# Patient Record
Sex: FEMALE | Race: WHITE | NOT HISPANIC OR LATINO | URBAN - METROPOLITAN AREA
[De-identification: names, ages, dates, MRNs, and addresses within clinical notes are randomized per-mention and may not be internally consistent; named-entity substitution may affect disease eponyms.]

---

## 2017-05-09 ENCOUNTER — ALLSCRIPTS OFFICE VISIT (OUTPATIENT)
Dept: OTHER | Facility: OTHER | Age: 37
End: 2017-05-09

## 2017-09-26 ENCOUNTER — ALLSCRIPTS OFFICE VISIT (OUTPATIENT)
Dept: OTHER | Facility: OTHER | Age: 37
End: 2017-09-26

## 2018-01-13 VITALS — WEIGHT: 139.13 LBS | HEIGHT: 64 IN | BODY MASS INDEX: 23.75 KG/M2

## 2018-01-31 ENCOUNTER — OFFICE VISIT (OUTPATIENT)
Dept: PLASTIC SURGERY | Facility: HOSPITAL | Age: 38
End: 2018-01-31

## 2018-01-31 VITALS — BODY MASS INDEX: 21.82 KG/M2 | WEIGHT: 127.8 LBS | HEIGHT: 64 IN

## 2018-01-31 DIAGNOSIS — L98.8 FACIAL RHYTIDS: Primary | ICD-10-CM

## 2018-01-31 PROCEDURE — 64612 DESTROY NERVE FACE MUSCLE: CPT | Performed by: SURGERY

## 2018-01-31 RX ORDER — DEXTROAMPHETAMINE SACCHARATE, AMPHETAMINE ASPARTATE MONOHYDRATE, DEXTROAMPHETAMINE SULFATE AND AMPHETAMINE SULFATE 2.5; 2.5; 2.5; 2.5 MG/1; MG/1; MG/1; MG/1
10 CAPSULE, EXTENDED RELEASE ORAL 2 TIMES DAILY
COMMUNITY
End: 2019-10-08 | Stop reason: ALTCHOICE

## 2018-01-31 NOTE — PROGRESS NOTES
Assessment/Plan:40 units of Botox was administered in the usual fashion to treat the glabella, brow, forehead, and lateral crow's feet  Of this, approximately 2 5 units was used at the level of the procerus  the usual posttreatment instructions were given  It was nearly 4 months since her previous visit, she has noticed that the effect had dissipated several weeks ago,  I suggested that she shorten the interval between treatments to closer to 3 months maximize the results  Subjective: dynamic creases of glabella, brow, forehead, lateral crow's feet  Bunny lines     Patient ID: Hailey Verma is a 40 y o  female      [facility] returns for Botox, she is quite pleased with her prior treatments, she noticed the effect dissipated several weeks    Review of Systems      Objective:     Physical Exam

## 2018-05-04 ENCOUNTER — OFFICE VISIT (OUTPATIENT)
Dept: PLASTIC SURGERY | Facility: CLINIC | Age: 38
End: 2018-05-04

## 2018-05-04 VITALS — BODY MASS INDEX: 20.33 KG/M2 | WEIGHT: 122 LBS | HEIGHT: 65 IN

## 2018-05-04 DIAGNOSIS — L98.8 FACIAL RHYTIDS: Primary | ICD-10-CM

## 2018-05-04 PROCEDURE — BOTOX1U PR BOTOX BY THE UNIT: Performed by: SURGERY

## 2018-05-04 NOTE — PROGRESS NOTES
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

## 2018-08-01 ENCOUNTER — OFFICE VISIT (OUTPATIENT)
Dept: PLASTIC SURGERY | Facility: HOSPITAL | Age: 38
End: 2018-08-01

## 2018-08-01 DIAGNOSIS — Z41.1 ENCOUNTER FOR COSMETIC PROCEDURE: ICD-10-CM

## 2018-08-01 PROCEDURE — BOTOX1U PR BOTOX BY THE UNIT: Performed by: SURGERY

## 2018-08-01 NOTE — PROGRESS NOTES
Kari Dipti returns for Botox, she has been quite pleased with her prior treatments  At today's visit 40 U of Botox was administered in the usual fashion to treat the glabella, brow, forehead, and lateral crows feet, of this, approximately 2 5 U was used at the level of the procerus  The usual post treatment instructions were given, she will be seen again in 3 months  Her store [employer] is located at [address] [place] [address] in [facility]

## 2018-11-28 ENCOUNTER — COSMETIC (OUTPATIENT)
Dept: PLASTIC SURGERY | Facility: HOSPITAL | Age: 38
End: 2018-11-28

## 2018-11-28 DIAGNOSIS — Z41.1 ENCOUNTER FOR COSMETIC PROCEDURE: ICD-10-CM

## 2018-11-28 PROCEDURE — BOTOX1U PR BOTOX BY THE UNIT: Performed by: SURGERY

## 2018-11-28 NOTE — PROGRESS NOTES
Oscar Mirza returns for Botox, she has been quite pleased with her prior treatments  At today's visit 40 units of Botox was administered in the usual fashion to treat the glabella, brow, forehead, and lateral crows feet, of this, approximately 2 5 units was used at the level of the procerus she expressed some interest in addressing the nasolabial folds, and we discussed the role of Juvederm, how it works, duration, etc   She will consider this for her next visit, also potentially addressing the marionette lines in submental crease  The usual post Botox instructions were given and she will be seen in 3 months

## 2018-12-28 ENCOUNTER — COSMETIC (OUTPATIENT)
Dept: PLASTIC SURGERY | Facility: CLINIC | Age: 38
End: 2018-12-28

## 2018-12-28 VITALS — WEIGHT: 122 LBS | BODY MASS INDEX: 20.33 KG/M2 | HEIGHT: 65 IN

## 2018-12-28 DIAGNOSIS — Z41.1 ENCOUNTER FOR COSMETIC SURGERY: Primary | ICD-10-CM

## 2018-12-28 PROCEDURE — COSCON: Performed by: SURGERY

## 2018-12-28 NOTE — PROGRESS NOTES
Assessment/Plan:  Please see HPI  We discussed breast augmentation in detail, including options for implants, saline versus silicone, smooth versus textured surface, round versus anatomic  We talked about the phenomenon of breast implant associated lymphoma  We discussed different incision choices including trans axillary periareolar, inframammary crease, trans umbilical   My strong preference is for the inframammary crease approach  We discussed the procedure, how it is performed, as well as potential risks, complications and limitations including, but not limited to infection, bleeding, scarring, asymmetry, contour deformity, inability to guarantee a cup size, she is aware that implants are not lifelong devices and that they will need to be replaced  Silicone implants care are to be assessed with MRI surveillance on an interval basis  The appropriate measurements and photographs were obtained  She tried several implants as well as implant sizers in a bra and is happiest in the range of 450 cc, specifically 450 cc on the right, 425 cc on the left, as the left breast is a bit larger than the right breast   We reviewed photographs of reasonable results, and their questions were answered to their satisfaction  I have directed them to implantinfo  com to review some photographs of desirable results, she will download those and we will review them at her next visit  She has been given the breast augmentation brochure  There are no diagnoses linked to this encounter  Subjective: Interested in breast augmentation      Patient ID: Taiwo Bergman is a 45 y o  female  HPI she is here today to discuss breast augmentation, she is accompanied by her   She currently wears a 34 B cup bra and is interested in augmentation mammoplasty    She has an upcoming trip in mid February and is contemplating surgery prior to the trip, with approximately a 1 month interval for convalescence/recovery prior to the trip     The following portions of the patient's history were reviewed and updated as appropriate: allergies, current medications, past family history, past medical history, past social history, past surgical history and problem list     Review of Systems      Objective:      Ht 5' 5" (1 651 m)   Wt 55 3 kg (122 lb)   BMI 20 30 kg/m²          Physical Exam   Constitutional: She is oriented to person, place, and time  She appears well-developed and well-nourished  HENT:   Head: Normocephalic  Eyes: Pupils are equal, round, and reactive to light  Neck: Normal range of motion  Pulmonary/Chest: Effort normal    Abdominal: Soft  Musculoskeletal: Normal range of motion  Neurological: She is alert and oriented to person, place, and time  Skin: Skin is warm  B cup breasts, presence of bilateral axillary breasts, sternal notch to nipple distances are 19 cm on the left, 20 cm on the right, intermammary distance is 18 cm  The inframammary crease to nipple distances are 5 5/8 cm on the left, 6/7 5 cm on the right  The left breast is somewhat larger than the right breast    Psychiatric: She has a normal mood and affect

## 2019-01-02 ENCOUNTER — COSMETIC (OUTPATIENT)
Dept: PLASTIC SURGERY | Facility: HOSPITAL | Age: 39
End: 2019-01-02

## 2019-01-02 DIAGNOSIS — Z41.1 ENCOUNTER FOR COSMETIC SURGERY: Primary | ICD-10-CM

## 2019-01-02 PROCEDURE — RECHECK: Performed by: SURGERY

## 2019-01-07 PROBLEM — Z41.1 ENCOUNTER FOR COSMETIC PROCEDURE: Status: ACTIVE | Noted: 2019-01-07

## 2019-01-09 ENCOUNTER — COSMETIC (OUTPATIENT)
Dept: PLASTIC SURGERY | Facility: HOSPITAL | Age: 39
End: 2019-01-09

## 2019-01-09 DIAGNOSIS — Z41.1 ENCOUNTER FOR COSMETIC SURGERY: Primary | ICD-10-CM

## 2019-01-09 PROCEDURE — RECHECK: Performed by: SURGERY

## 2019-01-09 NOTE — PROGRESS NOTES
[facility] returns for a preoperative visit to review breast augmentation photographs that she has downloaded from the Internet  Again she is happy with an implant range of 425-475 cc or so, the photographs to picked patient's with 425 cc implants, she will leave it up to me to determine the final implant size based on the appearance in the operating room  We reviewed the photos together, her questions were answered to her satisfaction, and reviewed reviewed the procedure again as far as potential risks, complications and limitations including, but not limited to infection, bleeding, scarring, asymmetry, she is aware that implants are not lifelong devices, there may be rippling, migration, deflation, silicone implants benefit from MRI surveillance  Consent was obtained  She will be seen the day prior to surgery to be marked

## 2019-01-14 ENCOUNTER — APPOINTMENT (OUTPATIENT)
Dept: LAB | Facility: MEDICAL CENTER | Age: 39
End: 2019-01-14
Payer: COMMERCIAL

## 2019-01-14 DIAGNOSIS — Z41.1 ENCOUNTER FOR COSMETIC PROCEDURE: ICD-10-CM

## 2019-01-14 LAB
ANION GAP SERPL CALCULATED.3IONS-SCNC: 6 MMOL/L (ref 4–13)
BASOPHILS # BLD AUTO: 0.07 THOUSANDS/ΜL (ref 0–0.1)
BASOPHILS NFR BLD AUTO: 1 % (ref 0–1)
BUN SERPL-MCNC: 14 MG/DL (ref 5–25)
CALCIUM SERPL-MCNC: 9.2 MG/DL (ref 8.3–10.1)
CHLORIDE SERPL-SCNC: 103 MMOL/L (ref 100–108)
CO2 SERPL-SCNC: 28 MMOL/L (ref 21–32)
CREAT SERPL-MCNC: 0.94 MG/DL (ref 0.6–1.3)
EOSINOPHIL # BLD AUTO: 0.39 THOUSAND/ΜL (ref 0–0.61)
EOSINOPHIL NFR BLD AUTO: 6 % (ref 0–6)
ERYTHROCYTE [DISTWIDTH] IN BLOOD BY AUTOMATED COUNT: 12.3 % (ref 11.6–15.1)
GFR SERPL CREATININE-BSD FRML MDRD: 77 ML/MIN/1.73SQ M
GLUCOSE SERPL-MCNC: 83 MG/DL (ref 65–140)
HCT VFR BLD AUTO: 39.3 % (ref 34.8–46.1)
HGB BLD-MCNC: 12.7 G/DL (ref 11.5–15.4)
IMM GRANULOCYTES # BLD AUTO: 0.02 THOUSAND/UL (ref 0–0.2)
IMM GRANULOCYTES NFR BLD AUTO: 0 % (ref 0–2)
LYMPHOCYTES # BLD AUTO: 2.37 THOUSANDS/ΜL (ref 0.6–4.47)
LYMPHOCYTES NFR BLD AUTO: 36 % (ref 14–44)
MCH RBC QN AUTO: 30 PG (ref 26.8–34.3)
MCHC RBC AUTO-ENTMCNC: 32.3 G/DL (ref 31.4–37.4)
MCV RBC AUTO: 93 FL (ref 82–98)
MONOCYTES # BLD AUTO: 0.58 THOUSAND/ΜL (ref 0.17–1.22)
MONOCYTES NFR BLD AUTO: 9 % (ref 4–12)
NEUTROPHILS # BLD AUTO: 3.13 THOUSANDS/ΜL (ref 1.85–7.62)
NEUTS SEG NFR BLD AUTO: 48 % (ref 43–75)
NRBC BLD AUTO-RTO: 0 /100 WBCS
PLATELET # BLD AUTO: 313 THOUSANDS/UL (ref 149–390)
PMV BLD AUTO: 9.6 FL (ref 8.9–12.7)
POTASSIUM SERPL-SCNC: 4 MMOL/L (ref 3.5–5.3)
RBC # BLD AUTO: 4.23 MILLION/UL (ref 3.81–5.12)
SODIUM SERPL-SCNC: 137 MMOL/L (ref 136–145)
WBC # BLD AUTO: 6.56 THOUSAND/UL (ref 4.31–10.16)

## 2019-01-14 PROCEDURE — 36415 COLL VENOUS BLD VENIPUNCTURE: CPT

## 2019-01-14 PROCEDURE — 85025 COMPLETE CBC W/AUTO DIFF WBC: CPT

## 2019-01-14 PROCEDURE — 80048 BASIC METABOLIC PNL TOTAL CA: CPT

## 2019-01-15 NOTE — PRE-PROCEDURE INSTRUCTIONS
Pre-Surgery Instructions:   Medication Instructions    amphetamine-dextroamphetamine (ADDERALL XR) 10 MG 24 hr capsule Instructed patient per Anesthesia Guidelines  Pre-procedure instructions given without any further questions or concerns at this time  Pt reports she will obtain the CHG wash at here appointment with dr Orion Garay tomorrow  She will review the written instructions prior to use

## 2019-01-16 ENCOUNTER — OFFICE VISIT (OUTPATIENT)
Dept: PLASTIC SURGERY | Facility: HOSPITAL | Age: 39
End: 2019-01-16

## 2019-01-16 DIAGNOSIS — Z41.1 ENCOUNTER FOR COSMETIC SURGERY: Primary | ICD-10-CM

## 2019-01-16 PROCEDURE — RECHECK: Performed by: SURGERY

## 2019-01-17 ENCOUNTER — ANESTHESIA (OUTPATIENT)
Dept: PERIOP | Facility: HOSPITAL | Age: 39
End: 2019-01-17
Payer: SELF-PAY

## 2019-01-17 ENCOUNTER — HOSPITAL ENCOUNTER (OUTPATIENT)
Facility: HOSPITAL | Age: 39
Setting detail: OUTPATIENT SURGERY
Discharge: HOME | End: 2019-01-17
Attending: SURGERY | Admitting: SURGERY
Payer: SELF-PAY

## 2019-01-17 ENCOUNTER — ANESTHESIA EVENT (OUTPATIENT)
Dept: PERIOP | Facility: HOSPITAL | Age: 39
End: 2019-01-17
Payer: SELF-PAY

## 2019-01-17 VITALS
WEIGHT: 118 LBS | BODY MASS INDEX: 19.66 KG/M2 | HEIGHT: 65 IN | SYSTOLIC BLOOD PRESSURE: 125 MMHG | OXYGEN SATURATION: 100 % | HEART RATE: 64 BPM | DIASTOLIC BLOOD PRESSURE: 78 MMHG | RESPIRATION RATE: 17 BRPM | TEMPERATURE: 99.5 F

## 2019-01-17 DIAGNOSIS — Z41.1 ENCOUNTER FOR COSMETIC PROCEDURE: Primary | ICD-10-CM

## 2019-01-17 LAB — EXT PREGNANCY TEST URINE: NEGATIVE

## 2019-01-17 PROCEDURE — 19325 BREAST AUGMENTATION W/IMPLT: CPT | Performed by: SURGERY

## 2019-01-17 PROCEDURE — C1789 PROSTHESIS, BREAST, IMP: HCPCS | Performed by: SURGERY

## 2019-01-17 PROCEDURE — 81025 URINE PREGNANCY TEST: CPT | Performed by: SURGERY

## 2019-01-17 DEVICE — SMOOTH ROUND HIGH PROFILE GEL-FILLED BREAST IMPLANT, 400CC  SMOOTH ROUND SILICONE
Type: IMPLANTABLE DEVICE | Site: BREAST | Status: FUNCTIONAL
Brand: MENTOR MEMORYGEL BREAST IMPLANT

## 2019-01-17 RX ORDER — PROPOFOL 10 MG/ML
INJECTION, EMULSION INTRAVENOUS AS NEEDED
Status: DISCONTINUED | OUTPATIENT
Start: 2019-01-17 | End: 2019-01-17 | Stop reason: SURG

## 2019-01-17 RX ORDER — FENTANYL CITRATE/PF 50 MCG/ML
25 SYRINGE (ML) INJECTION
Status: DISCONTINUED | OUTPATIENT
Start: 2019-01-17 | End: 2019-01-17 | Stop reason: HOSPADM

## 2019-01-17 RX ORDER — HYDROCODONE BITARTRATE AND ACETAMINOPHEN 5; 325 MG/1; MG/1
2 TABLET ORAL EVERY 6 HOURS PRN
Status: DISCONTINUED | OUTPATIENT
Start: 2019-01-17 | End: 2019-01-17 | Stop reason: HOSPADM

## 2019-01-17 RX ORDER — ONDANSETRON 4 MG/1
4 TABLET, ORALLY DISINTEGRATING ORAL EVERY 8 HOURS PRN
Qty: 10 TABLET | Refills: 0 | Status: SHIPPED | OUTPATIENT
Start: 2019-01-17 | End: 2019-10-08 | Stop reason: ALTCHOICE

## 2019-01-17 RX ORDER — FENTANYL CITRATE 50 UG/ML
INJECTION, SOLUTION INTRAMUSCULAR; INTRAVENOUS AS NEEDED
Status: DISCONTINUED | OUTPATIENT
Start: 2019-01-17 | End: 2019-01-17 | Stop reason: SURG

## 2019-01-17 RX ORDER — SODIUM CHLORIDE, SODIUM LACTATE, POTASSIUM CHLORIDE, CALCIUM CHLORIDE 600; 310; 30; 20 MG/100ML; MG/100ML; MG/100ML; MG/100ML
125 INJECTION, SOLUTION INTRAVENOUS CONTINUOUS
Status: DISCONTINUED | OUTPATIENT
Start: 2019-01-17 | End: 2019-01-17 | Stop reason: HOSPADM

## 2019-01-17 RX ORDER — METOCLOPRAMIDE HYDROCHLORIDE 5 MG/ML
10 INJECTION INTRAMUSCULAR; INTRAVENOUS ONCE AS NEEDED
Status: COMPLETED | OUTPATIENT
Start: 2019-01-17 | End: 2019-01-17

## 2019-01-17 RX ORDER — GLYCOPYRROLATE 0.2 MG/ML
INJECTION INTRAMUSCULAR; INTRAVENOUS AS NEEDED
Status: DISCONTINUED | OUTPATIENT
Start: 2019-01-17 | End: 2019-01-17 | Stop reason: SURG

## 2019-01-17 RX ORDER — NEOSTIGMINE METHYLSULFATE 1 MG/ML
INJECTION INTRAVENOUS AS NEEDED
Status: DISCONTINUED | OUTPATIENT
Start: 2019-01-17 | End: 2019-01-17 | Stop reason: SURG

## 2019-01-17 RX ORDER — ROCURONIUM BROMIDE 10 MG/ML
INJECTION, SOLUTION INTRAVENOUS AS NEEDED
Status: DISCONTINUED | OUTPATIENT
Start: 2019-01-17 | End: 2019-01-17 | Stop reason: SURG

## 2019-01-17 RX ORDER — METHOCARBAMOL 500 MG/1
500 TABLET, FILM COATED ORAL EVERY 6 HOURS PRN
Status: DISCONTINUED | OUTPATIENT
Start: 2019-01-17 | End: 2019-01-17 | Stop reason: HOSPADM

## 2019-01-17 RX ORDER — ONDANSETRON 2 MG/ML
INJECTION INTRAMUSCULAR; INTRAVENOUS AS NEEDED
Status: DISCONTINUED | OUTPATIENT
Start: 2019-01-17 | End: 2019-01-17 | Stop reason: SURG

## 2019-01-17 RX ORDER — DEXAMETHASONE SODIUM PHOSPHATE 4 MG/ML
INJECTION, SOLUTION INTRA-ARTICULAR; INTRALESIONAL; INTRAMUSCULAR; INTRAVENOUS; SOFT TISSUE AS NEEDED
Status: DISCONTINUED | OUTPATIENT
Start: 2019-01-17 | End: 2019-01-17 | Stop reason: SURG

## 2019-01-17 RX ORDER — BUPIVACAINE HYDROCHLORIDE 2.5 MG/ML
INJECTION, SOLUTION EPIDURAL; INFILTRATION; INTRACAUDAL AS NEEDED
Status: DISCONTINUED | OUTPATIENT
Start: 2019-01-17 | End: 2019-01-17 | Stop reason: HOSPADM

## 2019-01-17 RX ORDER — CEFAZOLIN SODIUM 1 G/50ML
1000 SOLUTION INTRAVENOUS ONCE
Status: COMPLETED | OUTPATIENT
Start: 2019-01-17 | End: 2019-01-17

## 2019-01-17 RX ORDER — HYDROCODONE BITARTRATE AND ACETAMINOPHEN 5; 325 MG/1; MG/1
1 TABLET ORAL EVERY 6 HOURS PRN
Qty: 30 TABLET | Refills: 0 | Status: SHIPPED | OUTPATIENT
Start: 2019-01-17 | End: 2019-10-08 | Stop reason: ALTCHOICE

## 2019-01-17 RX ORDER — MIDAZOLAM HYDROCHLORIDE 1 MG/ML
INJECTION INTRAMUSCULAR; INTRAVENOUS AS NEEDED
Status: DISCONTINUED | OUTPATIENT
Start: 2019-01-17 | End: 2019-01-17 | Stop reason: SURG

## 2019-01-17 RX ADMIN — HYDROCODONE BITARTRATE AND ACETAMINOPHEN 1 TABLET: 5; 325 TABLET ORAL at 21:11

## 2019-01-17 RX ADMIN — FENTANYL CITRATE 50 MCG: 50 INJECTION, SOLUTION INTRAMUSCULAR; INTRAVENOUS at 18:19

## 2019-01-17 RX ADMIN — FENTANYL CITRATE 50 MCG: 50 INJECTION, SOLUTION INTRAMUSCULAR; INTRAVENOUS at 18:55

## 2019-01-17 RX ADMIN — ONDANSETRON 4 MG: 2 INJECTION, SOLUTION INTRAMUSCULAR; INTRAVENOUS at 19:12

## 2019-01-17 RX ADMIN — NEOSTIGMINE METHYLSULFATE 3 MG: 1 INJECTION INTRAVENOUS at 19:23

## 2019-01-17 RX ADMIN — PROPOFOL 200 MG: 10 INJECTION, EMULSION INTRAVENOUS at 17:28

## 2019-01-17 RX ADMIN — FENTANYL CITRATE 50 MCG: 50 INJECTION, SOLUTION INTRAMUSCULAR; INTRAVENOUS at 19:31

## 2019-01-17 RX ADMIN — METHOCARBAMOL 500 MG: 500 TABLET, FILM COATED ORAL at 21:12

## 2019-01-17 RX ADMIN — FENTANYL CITRATE 50 MCG: 50 INJECTION, SOLUTION INTRAMUSCULAR; INTRAVENOUS at 17:26

## 2019-01-17 RX ADMIN — GLYCOPYRROLATE 0.6 MG: 0.2 INJECTION, SOLUTION INTRAMUSCULAR; INTRAVENOUS at 19:23

## 2019-01-17 RX ADMIN — ROCURONIUM BROMIDE 10 MG: 10 INJECTION, SOLUTION INTRAVENOUS at 18:10

## 2019-01-17 RX ADMIN — SODIUM CHLORIDE, SODIUM LACTATE, POTASSIUM CHLORIDE, AND CALCIUM CHLORIDE: .6; .31; .03; .02 INJECTION, SOLUTION INTRAVENOUS at 17:55

## 2019-01-17 RX ADMIN — SODIUM CHLORIDE, SODIUM LACTATE, POTASSIUM CHLORIDE, AND CALCIUM CHLORIDE: .6; .31; .03; .02 INJECTION, SOLUTION INTRAVENOUS at 14:49

## 2019-01-17 RX ADMIN — ROCURONIUM BROMIDE 10 MG: 10 INJECTION, SOLUTION INTRAVENOUS at 18:39

## 2019-01-17 RX ADMIN — FENTANYL CITRATE 25 MCG: 50 INJECTION, SOLUTION INTRAMUSCULAR; INTRAVENOUS at 20:26

## 2019-01-17 RX ADMIN — ROCURONIUM BROMIDE 30 MG: 10 INJECTION, SOLUTION INTRAVENOUS at 17:28

## 2019-01-17 RX ADMIN — CEFAZOLIN SODIUM 1000 MG: 1 SOLUTION INTRAVENOUS at 17:40

## 2019-01-17 RX ADMIN — METOCLOPRAMIDE 10 MG: 5 INJECTION, SOLUTION INTRAMUSCULAR; INTRAVENOUS at 20:10

## 2019-01-17 RX ADMIN — SODIUM CHLORIDE, SODIUM LACTATE, POTASSIUM CHLORIDE, AND CALCIUM CHLORIDE 125 ML/HR: .6; .31; .03; .02 INJECTION, SOLUTION INTRAVENOUS at 13:33

## 2019-01-17 RX ADMIN — MIDAZOLAM HYDROCHLORIDE 2 MG: 1 INJECTION, SOLUTION INTRAMUSCULAR; INTRAVENOUS at 17:23

## 2019-01-17 RX ADMIN — DEXAMETHASONE SODIUM PHOSPHATE 4 MG: 4 INJECTION, SOLUTION INTRAMUSCULAR; INTRAVENOUS at 18:20

## 2019-01-17 NOTE — H&P (VIEW-ONLY)
Chris Marino returns for a preoperative visit to review breast augmentation photographs that she has downloaded from the Internet  Again she is happy with an implant range of 425-475 cc or so, the photographs to picked patient's with 425 cc implants, she will leave it up to me to determine the final implant size based on the appearance in the operating room  We reviewed the photos together, her questions were answered to her satisfaction, and reviewed reviewed the procedure again as far as potential risks, complications and limitations including, but not limited to infection, bleeding, scarring, asymmetry, she is aware that implants are not lifelong devices, there may be rippling, migration, deflation, silicone implants benefit from MRI surveillance  Consent was obtained  She will be seen the day prior to surgery to be marked

## 2019-01-17 NOTE — ANESTHESIA PREPROCEDURE EVALUATION
Review of Systems/Medical History  Patient summary reviewed  Chart reviewed      Cardiovascular   Pulmonary       GI/Hepatic            Endo/Other     GYN       Hematology   Musculoskeletal       Neurology      Comment: ADD Psychology           Physical Exam    Airway    Mallampati score: I  TM Distance: >3 FB  Neck ROM: full     Dental       Cardiovascular  Cardiovascular exam normal    Pulmonary  Pulmonary exam normal     Other Findings        Anesthesia Plan  ASA Score- 2     Anesthesia Type- general with ASA Monitors  Additional Monitors:   Airway Plan: ETT  Plan Factors-    Induction- intravenous  Postoperative Plan-     Informed Consent- Anesthetic plan and risks discussed with patient  I personally reviewed this patient with the CRNA  Discussed and agreed on the Anesthesia Plan with the CRNA  Alvaro Prado

## 2019-01-18 ENCOUNTER — OFFICE VISIT (OUTPATIENT)
Dept: PLASTIC SURGERY | Facility: CLINIC | Age: 39
End: 2019-01-18

## 2019-01-18 DIAGNOSIS — Z48.89 POSTOPERATIVE VISIT: Primary | ICD-10-CM

## 2019-01-18 PROCEDURE — 99024 POSTOP FOLLOW-UP VISIT: CPT | Performed by: SURGERY

## 2019-01-18 NOTE — PROGRESS NOTES
Agnieszka Lindsay presents for a postoperative visit, 1st postop day status post bilateral augmentation mammoplasty  At today's visit the dressings were removed, the Steri-Strips are intact, the breasts look quite good, they are nicely symmetric, and there is no evidence of hematoma, fluid collection, etc   An implant strap was applied to apply pressure to the upper pole of the breasts, and the usual instructions were given  She will be seen again next week, we will discuss implant displacement exercises with her at that time  She knows to call should she have any questions

## 2019-01-18 NOTE — OP NOTE
OPERATIVE REPORT  PATIENT NAME: Seble Boland    :  1980  MRN: [id number]  Pt Location: [facility] OR ROOM 01    SURGERY DATE: 2019    Surgeon(s) and Role:     * Lyla Latham MD - Primary     * Mariely Scott MD - Assisting    Preop Diagnosis:  Encounter for cosmetic procedure [Z41 1]    Post-Op Diagnosis Codes:     * Encounter for cosmetic procedure [Z41 1]    Procedure(s) (LRB):  AUGMENTATION BREAST (Bilateral)    Specimen(s):  * No specimens in log *    Estimated Blood Loss:   Minimal    Drains:       Anesthesia Type:   General    Operative Indications:  Encounter for cosmetic procedure [Z41 1]  Desire for larger breast volume    Operative Findings:      Complications:   None    Procedure and Technique:  Anthony Morse was seen in the office the day prior to surgery, she was marked in usual fashion for bilateral breast augmentation, and we again discussed the procedure, potential risks, complications and limitations  The day of surgery, those marks were reinforced, she was then taken to the operating room and underwent induction of general anesthesia by the anesthesia personnel  The operative field was prepped and draped in sterile fashion a proper time-out was performed  The right side was addressed initially, the breast was infiltrated with xylocaine with epinephrine the usual fashion for augmentation mammoplasty  The inframammary crease incision was then created with a 15 blade, this carried down through the dermis and dissection proceeded through the subcutaneous tissue utilizing the Bovie cautery  A lighted mammary retractor was then utilized to aid in dissection, and a dual plane dissection was performed with elevation of the gland from the pectoralis to approximate its middle 3rd, this was followed by elevation of the lateral border the pectoralis with the Bovie cautery and lighted mammary retractor    A subpectoral pocket was then created utilizing the lighted retractor and the Bovie cautery, perforating vessels were clamped and cauterized prior to dividing it  The inframammary crease on the right side was lowered approximately 1 5 cm to match the fold on the left, after creation of the pocket, a 450 cc implant Sizer was placed within the pocket  And the skin was temporally closed with skin staples  Identical procedure was performed on the opposite, left breast, and a 425 cc implant Sizer was placed on the left side  Patient was then inspected from the foot of the bed both the supine and upright positions  The appearance was that of breast being larger than the patient desired based on our meetings in the office, therefore the sizers were removed M placed bilaterally with 400 cc sizers  This was be more in line with the patient's stated wishes in regard to the appearance of the breast   These that sizers were then removed, the wounds were irrigated several times with antibiotic solution and again inspected for hemostasis  Hemostasis was assured with the Bovie cautery, and after adequate hemostasis had been obtained the wounds were irrigated again, the skin was re-prepped, and the instruments were wiped down with antibiotic solution  The surgical team's gloves were changed  Magdalena Flatter was utilized and mentor 4 400 cc high-profile implants were placed bilaterally utilizing the Magdalena Flatter  Their position manually, and closure was then undertaken with 2-0 Vicryl sutures for deep plane closure, this was followed by 3-0 Vicryl at the level of the deep dermis  Prior to placing the last sutures in the deep layer, 10 cc of 0 25% Marcaine was instilled into each pocket     The wounds were then dressed with benzoin, Steri-Strips and dry sterile dressings  A surgical bra was applied the patient was transferred to the recovery room     I was present for the entire procedure    Patient Disposition:  PACU     SIGNATURE: Tona Henson MD  DATE: January 17, 2019  TIME: 7:38 PM

## 2019-01-23 ENCOUNTER — OFFICE VISIT (OUTPATIENT)
Dept: PLASTIC SURGERY | Facility: HOSPITAL | Age: 39
End: 2019-01-23

## 2019-01-23 DIAGNOSIS — Z48.89 POSTOPERATIVE VISIT: Primary | ICD-10-CM

## 2019-01-23 PROCEDURE — 99024 POSTOP FOLLOW-UP VISIT: CPT | Performed by: SURGERY

## 2019-01-23 NOTE — PROGRESS NOTES
Rich Knox presents today in follow-up, 6 days status post bilateral augmentation mammoplasty  Overall, she is doing well, and she is pleased with the early appearance/volume, etc   There is a bit more swelling of the left breast when compared to the right, no evidence of hematoma or any unusual swelling  The upper pole on the left is more prominent than on the right, and she will continue to utilize the implant strap  She has currently been using it 20 hr daily  We will see her again next week for suture removal, it is likely that she will benefit from the implant strap for a month or more, she knows to call the office should she have any questions

## 2019-01-30 NOTE — PROGRESS NOTES
Preop visit prior to augmentation mammoplasty  Again discussed the procedure how it is performed as well as potential risks, complications limitations  Reviewed photographs of which she feels would be reasonable results  She has reasonable expectations, we will continue to work on sizing issues

## 2019-01-30 NOTE — PROGRESS NOTES
Gunnar Maricarmen presents for a preoperative visit prior to augmentation mammoplasty which is scheduled for tomorrow  She was marked in the usual fashion for the procedure, we again discussed the procedure, how it is performed, as well as potential risks, complications, and limitations  Her questions have been answered to her satisfaction

## 2019-02-01 ENCOUNTER — OFFICE VISIT (OUTPATIENT)
Dept: PLASTIC SURGERY | Facility: CLINIC | Age: 39
End: 2019-02-01

## 2019-02-01 VITALS — WEIGHT: 118 LBS | BODY MASS INDEX: 19.66 KG/M2 | HEIGHT: 65 IN

## 2019-02-01 DIAGNOSIS — Z98.890 STATUS POST COSMETIC PLASTIC SURGERY: Primary | ICD-10-CM

## 2019-02-01 PROCEDURE — RECHECK: Performed by: PHYSICIAN ASSISTANT

## 2019-02-01 NOTE — PROGRESS NOTES
Assessment/Plan:   Rich Knox is a pleasant 80-year-old female who is 2 weeks status post bilateral breast augmentation  Please see HPI  She continues to wear the strap  Her sutures were removed today  She was given information on silicone scar gel and strips  She is encouraged to avoid underwire bras for the next 3 months  We will see her back in 4-6 weeks or sooner with any concerns  Diagnoses and all orders for this visit:    Status post cosmetic plastic surgery          Subjective:     Patient ID: Froilan Garland is a 45 y o  female  HPI   Rich Knox is a pleasant 80-year-old female who is 2 weeks status post bilateral breast augmentation  She reports some mild swelling  Otherwise, she is pleased with the size and shape  Review of Systems   Skin:        As per HPI  Objective:     Physical Exam   Skin:   Bilateral incisions are clean, dry and intact  Suture knots were removed  Breasts are soft and supple bilaterally  There is mild swelling noted bilaterally  Please see photos

## 2019-02-03 ENCOUNTER — HOSPITAL ENCOUNTER (EMERGENCY)
Facility: HOSPITAL | Age: 39
Discharge: HOME | End: 2019-02-03
Attending: EMERGENCY MEDICINE | Admitting: EMERGENCY MEDICINE
Payer: COMMERCIAL

## 2019-02-03 ENCOUNTER — APPOINTMENT (EMERGENCY)
Dept: RADIOLOGY | Facility: HOSPITAL | Age: 39
End: 2019-02-03
Payer: COMMERCIAL

## 2019-02-03 ENCOUNTER — APPOINTMENT (EMERGENCY)
Dept: CT IMAGING | Facility: HOSPITAL | Age: 39
End: 2019-02-03
Payer: COMMERCIAL

## 2019-02-03 VITALS
DIASTOLIC BLOOD PRESSURE: 76 MMHG | HEART RATE: 80 BPM | TEMPERATURE: 99.3 F | RESPIRATION RATE: 20 BRPM | OXYGEN SATURATION: 100 % | SYSTOLIC BLOOD PRESSURE: 111 MMHG | BODY MASS INDEX: 19.97 KG/M2 | WEIGHT: 120 LBS

## 2019-02-03 DIAGNOSIS — R06.00 DYSPNEA: Primary | ICD-10-CM

## 2019-02-03 DIAGNOSIS — F41.9 ANXIETY: ICD-10-CM

## 2019-02-03 LAB
ALBUMIN SERPL BCP-MCNC: 4.6 G/DL (ref 3.5–5)
ALP SERPL-CCNC: 55 U/L (ref 46–116)
ALT SERPL W P-5'-P-CCNC: 18 U/L (ref 12–78)
ANION GAP SERPL CALCULATED.3IONS-SCNC: 11 MMOL/L (ref 4–13)
AST SERPL W P-5'-P-CCNC: 15 U/L (ref 5–45)
BASOPHILS # BLD AUTO: 0.07 THOUSANDS/ΜL (ref 0–0.1)
BASOPHILS NFR BLD AUTO: 1 % (ref 0–1)
BILIRUB SERPL-MCNC: 0.69 MG/DL (ref 0.2–1)
BUN SERPL-MCNC: 14 MG/DL (ref 5–25)
CALCIUM SERPL-MCNC: 9.6 MG/DL (ref 8.3–10.1)
CHLORIDE SERPL-SCNC: 97 MMOL/L (ref 100–108)
CO2 SERPL-SCNC: 28 MMOL/L (ref 21–32)
CREAT SERPL-MCNC: 0.79 MG/DL (ref 0.6–1.3)
EOSINOPHIL # BLD AUTO: 0.33 THOUSAND/ΜL (ref 0–0.61)
EOSINOPHIL NFR BLD AUTO: 5 % (ref 0–6)
ERYTHROCYTE [DISTWIDTH] IN BLOOD BY AUTOMATED COUNT: 12 % (ref 11.6–15.1)
EXT PREG TEST URINE: NEGATIVE
GFR SERPL CREATININE-BSD FRML MDRD: 95 ML/MIN/1.73SQ M
GLUCOSE SERPL-MCNC: 98 MG/DL (ref 65–140)
HCT VFR BLD AUTO: 40.4 % (ref 34.8–46.1)
HGB BLD-MCNC: 13.5 G/DL (ref 11.5–15.4)
IMM GRANULOCYTES # BLD AUTO: 0.02 THOUSAND/UL (ref 0–0.2)
IMM GRANULOCYTES NFR BLD AUTO: 0 % (ref 0–2)
LYMPHOCYTES # BLD AUTO: 2.33 THOUSANDS/ΜL (ref 0.6–4.47)
LYMPHOCYTES NFR BLD AUTO: 32 % (ref 14–44)
MCH RBC QN AUTO: 30.6 PG (ref 26.8–34.3)
MCHC RBC AUTO-ENTMCNC: 33.4 G/DL (ref 31.4–37.4)
MCV RBC AUTO: 92 FL (ref 82–98)
MONOCYTES # BLD AUTO: 0.56 THOUSAND/ΜL (ref 0.17–1.22)
MONOCYTES NFR BLD AUTO: 8 % (ref 4–12)
NEUTROPHILS # BLD AUTO: 3.93 THOUSANDS/ΜL (ref 1.85–7.62)
NEUTS SEG NFR BLD AUTO: 54 % (ref 43–75)
NRBC BLD AUTO-RTO: 0 /100 WBCS
NT-PROBNP SERPL-MCNC: 13 PG/ML
PLATELET # BLD AUTO: 356 THOUSANDS/UL (ref 149–390)
PMV BLD AUTO: 9.2 FL (ref 8.9–12.7)
POTASSIUM SERPL-SCNC: 3.4 MMOL/L (ref 3.5–5.3)
PROT SERPL-MCNC: 8.6 G/DL (ref 6.4–8.2)
RBC # BLD AUTO: 4.41 MILLION/UL (ref 3.81–5.12)
SODIUM SERPL-SCNC: 136 MMOL/L (ref 136–145)
TROPONIN I SERPL-MCNC: <0.02 NG/ML
TROPONIN I SERPL-MCNC: <0.02 NG/ML
WBC # BLD AUTO: 7.24 THOUSAND/UL (ref 4.31–10.16)

## 2019-02-03 PROCEDURE — 71275 CT ANGIOGRAPHY CHEST: CPT

## 2019-02-03 PROCEDURE — 99285 EMERGENCY DEPT VISIT HI MDM: CPT

## 2019-02-03 PROCEDURE — 81025 URINE PREGNANCY TEST: CPT | Performed by: EMERGENCY MEDICINE

## 2019-02-03 PROCEDURE — 96374 THER/PROPH/DIAG INJ IV PUSH: CPT

## 2019-02-03 PROCEDURE — 36415 COLL VENOUS BLD VENIPUNCTURE: CPT

## 2019-02-03 PROCEDURE — 80053 COMPREHEN METABOLIC PANEL: CPT | Performed by: EMERGENCY MEDICINE

## 2019-02-03 PROCEDURE — 36415 COLL VENOUS BLD VENIPUNCTURE: CPT | Performed by: EMERGENCY MEDICINE

## 2019-02-03 PROCEDURE — 83880 ASSAY OF NATRIURETIC PEPTIDE: CPT | Performed by: EMERGENCY MEDICINE

## 2019-02-03 PROCEDURE — 84484 ASSAY OF TROPONIN QUANT: CPT | Performed by: EMERGENCY MEDICINE

## 2019-02-03 PROCEDURE — 93005 ELECTROCARDIOGRAM TRACING: CPT

## 2019-02-03 PROCEDURE — 85025 COMPLETE CBC W/AUTO DIFF WBC: CPT | Performed by: EMERGENCY MEDICINE

## 2019-02-03 PROCEDURE — 71046 X-RAY EXAM CHEST 2 VIEWS: CPT

## 2019-02-03 RX ORDER — ALPRAZOLAM 0.25 MG/1
0.25 TABLET ORAL 2 TIMES DAILY PRN
Qty: 8 TABLET | Refills: 0 | Status: SHIPPED | OUTPATIENT
Start: 2019-02-03 | End: 2019-10-08 | Stop reason: ALTCHOICE

## 2019-02-03 RX ORDER — LORAZEPAM 2 MG/ML
1 INJECTION INTRAMUSCULAR ONCE
Status: COMPLETED | OUTPATIENT
Start: 2019-02-03 | End: 2019-02-03

## 2019-02-03 RX ADMIN — IOHEXOL 85 ML: 350 INJECTION, SOLUTION INTRAVENOUS at 14:06

## 2019-02-03 RX ADMIN — LORAZEPAM 1 MG: 2 INJECTION INTRAMUSCULAR; INTRAVENOUS at 13:48

## 2019-02-03 NOTE — DISCHARGE INSTRUCTIONS
Anxiety   WHAT YOU NEED TO KNOW:   Anxiety is a condition that causes you to feel extremely worried or nervous  The feelings are so strong that they can cause problems with your daily activities or sleep  Anxiety may be triggered by something you fear, or it may happen without a cause  Family or work stress, smoking, caffeine, and alcohol can increase your risk for anxiety  Certain medicines or health conditions can also increase your risk  Anxiety can become a long-term condition if it is not managed or treated  DISCHARGE INSTRUCTIONS:   Call 911 if:   · You have chest pain, tightness, or heaviness that may spread to your shoulders, arms, jaw, neck, or back  · You feel like hurting yourself or someone else  Contact your healthcare provider if:   · Your symptoms get worse or do not get better with treatment  · Your anxiety keeps you from doing your regular daily activities  · You have new symptoms since your last visit  · You have questions or concerns about your condition or care  Medicines:   · Medicines  may be given to help you feel more calm and relaxed, and decrease your symptoms  · Take your medicine as directed  Contact your healthcare provider if you think your medicine is not helping or if you have side effects  Tell him of her if you are allergic to any medicine  Keep a list of the medicines, vitamins, and herbs you take  Include the amounts, and when and why you take them  Bring the list or the pill bottles to follow-up visits  Carry your medicine list with you in case of an emergency  Follow up with your healthcare provider within 2 weeks or as directed:  Write down your questions so you remember to ask them during your visits  Manage anxiety:   · Talk to someone about your anxiety  Your healthcare provider may suggest counseling  Cognitive behavioral therapy can help you understand and change how you react to events that trigger your symptoms   You might feel more comfortable talking with a friend or family member about your anxiety  Choose someone you know will be supportive and encouraging  · Find ways to relax  Activities such as exercise, meditation, or listening to music can help you relax  Spend time with friends, or do things you enjoy  · Practice deep breathing  Deep breathing can help you relax when you feel anxious  Focus on taking slow, deep breaths several times a day, or during an anxiety attack  Breathe in through your nose and out through your mouth  · Create a regular sleep routine  Regular sleep can help you feel calmer during the day  Go to sleep and wake up at the same times every day  Do not watch television or use the computer right before bed  Your room should be comfortable, dark, and quiet  · Eat a variety of healthy foods  Healthy foods include fruits, vegetables, low-fat dairy products, lean meats, fish, whole-grain breads, and cooked beans  Healthy foods can help you feel less anxious and have more energy  · Exercise regularly  Exercise can increase your energy level  Exercise may also lift your mood and help you sleep better  Your healthcare provider can help you create an exercise plan  · Do not smoke  Nicotine and other chemicals in cigarettes and cigars can increase anxiety  Ask your healthcare provider for information if you currently smoke and need help to quit  E-cigarettes or smokeless tobacco still contain nicotine  Talk to your healthcare provider before you use these products  · Do not have caffeine  Caffeine can make your symptoms worse  Do not have foods or drinks that are meant to increase your energy level  · Limit or do not drink alcohol  Ask your healthcare provider if alcohol is safe for you  You may not be able to drink alcohol if you take certain anxiety or depression medicines  Limit alcohol to 1 drink per day if you are a woman  Limit alcohol to 2 drinks per day if you are a man   A drink of alcohol is 12 ounces of beer, 5 ounces of wine, or 1½ ounces of liquor  · Do not use drugs  Drugs can make your anxiety worse  It can also make anxiety hard to manage  Talk to your healthcare provider if you use drugs and want help to quit  © 2017 [employer] Information is for End User's use only and may not be sold, redistributed or otherwise used for commercial purposes  All illustrations and images included in CareNotes® are the copyrighted property of [employer]  or [employer]  The above information is an  only  It is not intended as medical advice for individual conditions or treatments  Talk to your doctor, nurse or pharmacist before following any medical regimen to see if it is safe and effective for you  Dyspnea   WHAT YOU NEED TO KNOW:   Dyspnea is breathing difficulty or discomfort  You may have labored, painful, or shallow breathing  You may feel breathless or short of breath  Dyspnea can occur during rest or with activity  You may have dyspnea for a short time, or it might become chronic  Dyspnea is often a symptom of a disease or condition  DISCHARGE INSTRUCTIONS:   Return to the emergency department if:   · Your signs and symptoms are the same or worse within 24 hours of treatment  · You have shaking chills or a fever over 102°F      · You have new pain, pressure, or tightness in your chest      · You have a new or worse cough or wheezing, or you cough up blood  · You feel like you cannot get enough air  · The skin over your ribs or on your neck sinks in when you breathe  · You have a severe headache with vomiting and abdominal pain  · You feel confused or dizzy  Contact your healthcare provider or specialist if:   · You have questions or concerns about your condition or care  Medicines:   · Medicines  may be used to treat the cause of your dyspnea   Medicines may reduce swelling in your airway or decrease extra fluid from around your heart or lungs  Other medicines may be used to decrease anxiety and help you feel calm and relaxed  · Take your medicine as directed  Contact your healthcare provider if you think your medicine is not helping or if you have side effects  Tell him or her if you are allergic to any medicine  Keep a list of the medicines, vitamins, and herbs you take  Include the amounts, and when and why you take them  Bring the list or the pill bottles to follow-up visits  Carry your medicine list with you in case of an emergency  Manage long-term dyspnea:   · Create an action plan  You and your healthcare provider can work together to create a plan for how to handle episodes of dyspnea  The plan can include daily activities, treatment changes, and what to do if you have severe breathing problems  · Lean forward on your elbows when you sit  This helps your lungs expand and may make it easier to breathe  · Use pursed-lip breathing any time you feel short of breath  Breathe in through your nose and then slowly breathe out through your mouth with your lips slightly puckered  It should take you twice as long to breathe out as it did to breathe in  · Do not smoke  Nicotine and other chemicals in cigarettes and cigars can cause lung damage and make it harder to breathe  Ask your healthcare provider for information if you currently smoke and need help to quit  E-cigarettes or smokeless tobacco still contain nicotine  Talk to your healthcare provider before you use these products  · Reach or maintain a healthy weight  Your healthcare provider can help you create a safe weight loss plan if you are overweight  · Exercise as directed  Exercise can help your lungs work more easily  Exercise can also help you lose weight if needed  Try to get at least 30 minutes of exercise most days of the week  Your healthcare provider can help you create an exercise plan that is safe for you    Follow up with your healthcare provider or specialist as directed:  Write down your questions so you remember to ask them during your visits  © 2017 [employer] Information is for End User's use only and may not be sold, redistributed or otherwise used for commercial purposes  All illustrations and images included in CareNotes® are the copyrighted property of [employer]  or [employer]  The above information is an  only  It is not intended as medical advice for individual conditions or treatments  Talk to your doctor, nurse or pharmacist before following any medical regimen to see if it is safe and effective for you

## 2019-02-03 NOTE — ED NOTES
Patient reports her SOB is better and that she is feeling more relaxed       Lorenzo Jc RN  02/03/19 8431

## 2019-02-03 NOTE — ED NOTES
States with SOB x1 week worse yesterday and today also states while in ED waiting room felt near syncope and had numbness and tingling b/l arms, states is now resolving        Marcella Payne, RN  02/03/19 9499

## 2019-02-03 NOTE — ED PROVIDER NOTES
History  Chief Complaint   Patient presents with    Shortness of Breath     Pt reports feeling SOB since last night  Reports had a breast augmentation approx 2 weeks ago, called surgeon and referred pt to ED  Pt reports tingling in hands and face, feeling as she is going to pass out  Visibly anxious in triage room       History provided by:  Patient   used: No    Medical Problem - Major   Location:  Dyspnea  Severity:  Moderate  Onset quality:  Gradual  Duration:  1 week  Timing:  Intermittent  Progression:  Worsening  Chronicity:  New  Context:  Status post breast augmentation 2 weeks ago which was done without complications  Dyspnea gradually worsening over the last week  Started with feeling like he can't take a deep enough breath  No chest pain  No leg pain or swelling  Relieved by:  Nothing  Worsened by:  Nothing  Ineffective treatments:  None tried  Associated symptoms: shortness of breath    Associated symptoms: no abdominal pain, no chest pain, no congestion, no cough, no fever, no headaches, no nausea, no rash, no rhinorrhea, no sore throat, no vomiting and no wheezing        Prior to Admission Medications   Prescriptions Last Dose Informant Patient Reported? Taking? HYDROcodone-acetaminophen (NORCO) 5-325 mg per tablet   No No   Sig: Take 1 tablet by mouth every 6 (six) hours as needed for pain Max Daily Amount: 4 tablets   Multiple Vitamins-Minerals (MULTIVITAMIN PO)   Yes No   Sig: Take by mouth   amphetamine-dextroamphetamine (ADDERALL XR) 10 MG 24 hr capsule   Yes No   Sig: Take 10 mg by mouth 2 (two) times a day     ondansetron (ZOFRAN-ODT) 4 mg disintegrating tablet   No No   Sig: Take 1 tablet (4 mg total) by mouth every 8 (eight) hours as needed for nausea or vomiting      Facility-Administered Medications: None       History reviewed  No pertinent past medical history      Past Surgical History:   Procedure Laterality Date    AUGMENTATION BREAST Bilateral 1/17/2019 Procedure: AUGMENTATION BREAST;  Surgeon: Julissa Winter MD;  Location: [facility];  Service: Plastics    WISDOM TOOTH EXTRACTION         History reviewed  No pertinent family history  I have reviewed and agree with the history as documented  Social History   Substance Use Topics    Smoking status: Never Smoker    Smokeless tobacco: Never Used    Alcohol use Yes      Comment: socially        Review of Systems   Constitutional: Negative for chills and fever  HENT: Negative for congestion, rhinorrhea and sore throat  Respiratory: Positive for shortness of breath  Negative for cough, chest tightness and wheezing  Cardiovascular: Negative for chest pain and leg swelling  Gastrointestinal: Negative for abdominal pain, nausea and vomiting  Skin: Negative for rash  Neurological: Negative for headaches  All other systems reviewed and are negative  Physical Exam  Physical Exam   Constitutional: She appears well-developed and well-nourished  She is cooperative  Non-toxic appearance  She does not have a sickly appearance  She does not appear ill  No distress  HENT:   Head: Normocephalic and atraumatic  Right Ear: Hearing normal  No drainage or swelling  Left Ear: Hearing normal  No drainage or swelling  Mouth/Throat: Mucous membranes are normal    Eyes: Conjunctivae and lids are normal  Right eye exhibits no discharge  Left eye exhibits no discharge  Neck: Trachea normal and normal range of motion  No JVD present  Cardiovascular: Normal rate, regular rhythm, normal heart sounds, intact distal pulses and normal pulses  Exam reveals no gallop and no friction rub  No murmur heard  Pulmonary/Chest: Effort normal and breath sounds normal  No stridor  No respiratory distress  She has no wheezes  She has no rales  Abdominal: Soft  Normal appearance  She exhibits no ascites and no mass  There is no hepatosplenomegaly  There is no tenderness   There is no rebound, no guarding and no CVA tenderness  Musculoskeletal: Normal range of motion  She exhibits no edema or tenderness  Lymphadenopathy:        Right: No inguinal adenopathy present  Left: No inguinal adenopathy present  Neurological: She is alert  She has normal strength  She exhibits normal muscle tone  Gait normal  GCS eye subscore is 4  GCS verbal subscore is 5  GCS motor subscore is 6  Skin: Skin is warm, dry and intact  No rash noted  She is not diaphoretic  No pallor  Psychiatric: Her speech is normal  Her mood appears anxious  Cognition and memory are normal    Nursing note and vitals reviewed        Vital Signs  ED Triage Vitals   Temperature Pulse Respirations Blood Pressure SpO2   02/03/19 1224 02/03/19 1224 02/03/19 1224 02/03/19 1224 02/03/19 1224   99 3 °F (37 4 °C) (!) 154 (!) 26 145/72 100 %      Temp Source Heart Rate Source Patient Position - Orthostatic VS BP Location FiO2 (%)   02/03/19 1224 02/03/19 1224 02/03/19 1345 02/03/19 1224 --   Temporal Monitor Lying Right arm       Pain Score       02/03/19 1345       No Pain           Vitals:    02/03/19 1224 02/03/19 1345 02/03/19 1538   BP: 145/72 120/72 111/76   Pulse: (!) 154 95 80   Patient Position - Orthostatic VS:  Lying Lying       Visual Acuity      ED Medications  Medications   LORazepam (ATIVAN) 2 mg/mL injection 1 mg (1 mg Intravenous Given 2/3/19 1348)   iohexol (OMNIPAQUE) 350 MG/ML injection (MULTI-DOSE) 85 mL (85 mL Intravenous Given 2/3/19 1406)       Diagnostic Studies  Results Reviewed     Procedure Component Value Units Date/Time    Troponin I [[id number]]  (Normal) Collected:  02/03/19 1516    Lab Status:  Final result Specimen:  Blood from Arm, Right Updated:  02/03/19 1545     Troponin I <0 02 ng/mL     B-type natriuretic peptide [[id number]]  (Normal) Collected:  02/03/19 1350    Lab Status:  Final result Specimen:  Blood from Arm, Right Updated:  02/03/19 1414     NT-proBNP 13 pg/mL     Troponin I [[id number]]  (Normal) Collected: 02/03/19 1249    Lab Status:  Final result Specimen:  Blood from Arm, Right Updated:  02/03/19 1313     Troponin I <0 02 ng/mL     Comprehensive metabolic panel [[id number]]  (Abnormal) Collected:  02/03/19 1249    Lab Status:  Final result Specimen:  Blood from Arm, Right Updated:  02/03/19 1310     Sodium 136 mmol/L      Potassium 3 4 (L) mmol/L      Chloride 97 (L) mmol/L      CO2 28 mmol/L      ANION GAP 11 mmol/L      BUN 14 mg/dL      Creatinine 0 79 mg/dL      Glucose 98 mg/dL      Calcium 9 6 mg/dL      AST 15 U/L      ALT 18 U/L      Alkaline Phosphatase 55 U/L      Total Protein 8 6 (H) g/dL      Albumin 4 6 g/dL      Total Bilirubin 0 69 mg/dL      eGFR 95 ml/min/1 73sq m     Narrative:         National Kidney Disease Education Program recommendations are as follows:  GFR calculation is accurate only with a steady state creatinine  Chronic Kidney disease less than 60 ml/min/1 73 sq  meters  Kidney failure less than 15 ml/min/1 73 sq  meters      CBC and differential [[id number]] Collected:  02/03/19 1249    Lab Status:  Final result Specimen:  Blood from Arm, Right Updated:  02/03/19 1255     WBC 7 24 Thousand/uL      RBC 4 41 Million/uL      Hemoglobin 13 5 g/dL      Hematocrit 40 4 %      MCV 92 fL      MCH 30 6 pg      MCHC 33 4 g/dL      RDW 12 0 %      MPV 9 2 fL      Platelets 471 Thousands/uL      nRBC 0 /100 WBCs      Neutrophils Relative 54 %      Immat GRANS % 0 %      Lymphocytes Relative 32 %      Monocytes Relative 8 %      Eosinophils Relative 5 %      Basophils Relative 1 %      Neutrophils Absolute 3 93 Thousands/µL      Immature Grans Absolute 0 02 Thousand/uL      Lymphocytes Absolute 2 33 Thousands/µL      Monocytes Absolute 0 56 Thousand/µL      Eosinophils Absolute 0 33 Thousand/µL      Basophils Absolute 0 07 Thousands/µL     POCT pregnancy, urine [[id number]]  (Normal) Resulted:  02/03/19 1251    Lab Status:  Final result Updated:  02/03/19 1251     EXT PREG TEST UR (Ref: Negative) negative                 X-ray chest 2 views   ED Interpretation by Merlene Christensen MD (02/03 1184)   I have personally reviewed the x-ray and my findings are: no acute disease  CTA ED chest PE study   Final Result by Ney Lee MD (02/03 4990)      1  No pulmonary embolism   2  Presternal soft tissue air  Can be residual air from postsurgical change or from infection  The study was marked in [place] [facility] for immediate notification              Workstation performed: [id number]                    Procedures  ECG 12 Lead Documentation  Date/Time: 2/3/2019 1:27 PM  Performed by: Mirlande Michel by: David Hannah     Indications / Diagnosis:  Dyspnea  ECG reviewed by me, the ED Provider: yes    Patient location:  ED  Interpretation:     Interpretation: non-specific    Rate:     ECG rate:  96    ECG rate assessment: normal    Rhythm:     Rhythm: sinus rhythm    Ectopy:     Ectopy: none    QRS:     QRS axis:  Normal    QRS intervals:  Normal  Conduction:     Conduction: abnormal      Abnormal conduction: incomplete RBBB    ST segments:     ST segments:  Non-specific  T waves:     T waves: non-specific      ECG 12 Lead Documentation  Date/Time: 2/3/2019 3:40 PM  Performed by: Mirlande Michel by: David Hannah     Indications / Diagnosis:  Dyspnea reeval  ECG reviewed by me, the ED Provider: yes    Interpretation:     Interpretation: non-specific    Rate:     ECG rate assessment: normal    Rhythm:     Rhythm: sinus rhythm    Ectopy:     Ectopy: none    QRS:     QRS axis:  Normal    QRS intervals:  Normal  Conduction:     Conduction: normal    ST segments:     ST segments:  Non-specific  T waves:     T waves: non-specific             Phone Contacts  ED Phone Contact    ED Course               PERC Rule for PE      Most Recent Value   PERC Rule for PE   Age >=50  0 Filed at: 02/03/2019 1338   HR >=100  1 Filed at: 02/03/2019 1338   O2 Sat on room air < 95%     History of PE or DVT     Recent trauma or surgery     Hemoptysis     Exogenous estrogen     Unilateral leg swelling     PERC Rule for PE Results  1 Filed at: 02/03/2019 1338                Seema Amato' Criteria for PE      Most Recent Value   Wells' Criteria for PE   Clinical signs and symptoms of DVT  0 Filed at: 02/03/2019 1338   PE is primary diagnosis or equally likely  3 Filed at: 02/03/2019 1338   HR >100  1 5 Filed at: 02/03/2019 1338   Immobilization at least 3 days or Surgery in the previous 4 weeks  1 5 Filed at: 02/03/2019 1338   Previous, objectively diagnosed PE or DVT  0 Filed at: 02/03/2019 1338   Hemoptysis  0 Filed at: 02/03/2019 1338   Malignancy with treatment within 6 months or palliative  0 Filed at: 02/03/2019 1338   Seema Amato' Criteria Total  6 Filed at: 02/03/2019 1338            MDM  Number of Diagnoses or Management Options  Anxiety:   Dyspnea:   Diagnosis management comments: Chest x-ray reveals no pneumothorax or infiltrate  CT of the chest was performed to rule out pulmonary embolism is no acute abnormality other than some air in the presternal area which would not be unusual for her recent surgery  She has no fever  Oxygen saturation 100%  Repeat troponin and her BMP were all normal   After the Ativan her symptoms became much better so could be a component of anxiety  This was all discussed with the patient  No PE         Amount and/or Complexity of Data Reviewed  Clinical lab tests: ordered and reviewed  Tests in the radiology section of CPT®: ordered  Tests in the medicine section of CPT®: reviewed and ordered  Independent visualization of images, tracings, or specimens: yes    Patient Progress  Patient progress: stable      Disposition  Final diagnoses:   Dyspnea   Anxiety     Time reflects when diagnosis was documented in both MDM as applicable and the Disposition within this note     Time User Action Codes Description Comment    2/3/2019  3:41 PM Lonnie Mroa Add [R06 00] Dyspnea     2/3/2019  3:41 PM Geeta Millard Add [F41 9] Anxiety       ED Disposition     ED Disposition Condition Date/Time Comment    Discharge  Sun Feb 3, 2019  4:10 PM Tesha Rodríguez discharge to home/self care  Condition at discharge: Good        Follow-up Information     Follow up With Specialties Details Why Contact Info    Brendon Bradley, DO  Schedule an appointment as soon as possible for a visit in 3 days  [address]            Patient's Medications   Discharge Prescriptions    ALPRAZOLAM (XANAX) 0 25 MG TABLET    Take 1 tablet (0 25 mg total) by mouth 2 (two) times a day as needed for anxiety for up to 10 days       Start Date: 2/3/2019  End Date: 2/13/2019       Order Dose: 0 25 mg       Quantity: 8 tablet    Refills: 0     No discharge procedures on file      ED Provider  Electronically Signed by           Merlene Christensen MD  02/03/19 5276

## 2019-02-03 NOTE — ED NOTES
Pt ambulated unassisted to restroom with steady gait and back to willis bed 16 without difficulty        Noris Yang RN  02/03/19 8548

## 2019-02-04 LAB
ATRIAL RATE: 85 BPM
ATRIAL RATE: 96 BPM
P AXIS: 81 DEGREES
P AXIS: 86 DEGREES
PR INTERVAL: 130 MS
PR INTERVAL: 136 MS
QRS AXIS: 81 DEGREES
QRS AXIS: 83 DEGREES
QRSD INTERVAL: 82 MS
QRSD INTERVAL: 88 MS
QT INTERVAL: 350 MS
QT INTERVAL: 388 MS
QTC INTERVAL: 442 MS
QTC INTERVAL: 461 MS
T WAVE AXIS: 72 DEGREES
T WAVE AXIS: 8 DEGREES
VENTRICULAR RATE: 85 BPM
VENTRICULAR RATE: 96 BPM

## 2019-02-04 PROCEDURE — 93010 ELECTROCARDIOGRAM REPORT: CPT | Performed by: INTERNAL MEDICINE

## 2019-03-06 ENCOUNTER — COSMETIC (OUTPATIENT)
Dept: PLASTIC SURGERY | Facility: HOSPITAL | Age: 39
End: 2019-03-06

## 2019-03-06 VITALS — HEIGHT: 65 IN | WEIGHT: 120 LBS | BODY MASS INDEX: 19.99 KG/M2

## 2019-03-06 DIAGNOSIS — Z41.1 ENCOUNTER FOR COSMETIC PROCEDURE: ICD-10-CM

## 2019-03-06 PROCEDURE — BOTOX1U PR BOTOX BY THE UNIT: Performed by: SURGERY

## 2019-06-26 ENCOUNTER — COSMETIC (OUTPATIENT)
Dept: PLASTIC SURGERY | Facility: HOSPITAL | Age: 39
End: 2019-06-26

## 2019-06-26 DIAGNOSIS — Z41.1 ENCOUNTER FOR COSMETIC PROCEDURE: ICD-10-CM

## 2019-06-26 PROCEDURE — BOTOX1U PR BOTOX BY THE UNIT: Performed by: SURGERY

## 2019-10-08 ENCOUNTER — COSMETIC (OUTPATIENT)
Dept: PLASTIC SURGERY | Facility: CLINIC | Age: 39
End: 2019-10-08

## 2019-10-08 VITALS — HEIGHT: 65 IN | BODY MASS INDEX: 20.33 KG/M2 | WEIGHT: 122 LBS

## 2019-10-08 DIAGNOSIS — Z41.1 ENCOUNTER FOR COSMETIC PROCEDURE: ICD-10-CM

## 2019-10-08 DIAGNOSIS — Z98.890 STATUS POST COSMETIC PLASTIC SURGERY: Primary | ICD-10-CM

## 2019-10-08 PROCEDURE — BOTOX1U PR BOTOX BY THE UNIT: Performed by: SURGERY

## 2019-10-08 PROCEDURE — 99024 POSTOP FOLLOW-UP VISIT: CPT | Performed by: SURGERY

## 2019-10-08 RX ORDER — FERROUS SULFATE 325(65) MG
325 TABLET ORAL
COMMUNITY
Start: 2016-12-05

## 2019-10-08 RX ORDER — ASCORBIC ACID 250 MG
250 TABLET ORAL
COMMUNITY
Start: 2016-12-05

## 2019-10-08 NOTE — PROGRESS NOTES
Luis Burnett presents today for a follow-up visit, status post bilateral breast augmentation in January of 2019  She is very happy with the results  The breasts are soft, supple and without evidence of capsular contracture  She had previously been wearing an a cup bra and now wears a C/D cup bra  Again, she is pleased with the results, I will see her again in 4 months, at the same time she will be receiving Botox

## 2019-10-08 NOTE — PROGRESS NOTES
Lucy Kandy returns for Botox, she has been very happy with prior treatments  At today's visit 40 units of Botox was administered in the usual fashion to treat the glabella, brow, forehead, and lateral crows feet, of this approximately 2 5 units was used at the level of the procerus  The usual post treatment instructions were given, and at her request, she will be seen again in 4 months

## 2020-02-18 ENCOUNTER — COSMETIC (OUTPATIENT)
Dept: PLASTIC SURGERY | Facility: CLINIC | Age: 40
End: 2020-02-18

## 2020-02-18 DIAGNOSIS — Z41.1 ENCOUNTER FOR COSMETIC PROCEDURE: ICD-10-CM

## 2020-02-18 PROCEDURE — BOTOX1U PR BOTOX BY THE UNIT: Performed by: SURGERY

## 2020-02-18 NOTE — PROGRESS NOTES
Vicki Nic returns for Botox, she has been quite pleased with her prior treatments  At today's visit 40 units of Botox was administered in the usual fashion to treat the glabella, brow, forehead and lateral crows feet, of this approximately 2 5 units was used the level of procerus  The usual post treatment instructions were given and she will be seen again in 3 months

## 2020-05-29 ENCOUNTER — COSMETIC (OUTPATIENT)
Dept: PLASTIC SURGERY | Facility: CLINIC | Age: 40
End: 2020-05-29

## 2020-05-29 VITALS — TEMPERATURE: 99.4 F

## 2020-05-29 DIAGNOSIS — Z41.1 ENCOUNTER FOR COSMETIC PROCEDURE: ICD-10-CM

## 2020-05-29 PROCEDURE — BOTOX1U PR BOTOX BY THE UNIT: Performed by: SURGERY

## 2020-10-02 ENCOUNTER — COSMETIC (OUTPATIENT)
Dept: PLASTIC SURGERY | Facility: CLINIC | Age: 40
End: 2020-10-02

## 2020-10-02 DIAGNOSIS — Z41.1 ENCOUNTER FOR COSMETIC PROCEDURE: ICD-10-CM

## 2020-10-02 PROCEDURE — RECHECK: Performed by: SURGERY

## 2020-10-02 PROCEDURE — BOTOX1U PR BOTOX BY THE UNIT: Performed by: SURGERY

## 2021-04-06 ENCOUNTER — COSMETIC (OUTPATIENT)
Dept: PLASTIC SURGERY | Facility: CLINIC | Age: 41
End: 2021-04-06

## 2021-04-06 DIAGNOSIS — Z41.1 ENCOUNTER FOR COSMETIC PROCEDURE: Primary | ICD-10-CM

## 2021-04-06 PROCEDURE — BOTOX1U PR BOTOX BY THE UNIT: Performed by: SURGERY

## 2021-04-06 NOTE — PROGRESS NOTES
Perla Mcghee returns today for Botox, she has been pleased with her prior treatments  At today's visit 40 units of Botox was administered in the usual fashion to treat the glabella, brow, and lateral crows feet, approximately 2 5 units was used at the level of the procerus  Ice was applied post treatment, usual post treatment instructions were given  She will be seen again in 3-4 months

## 2021-08-31 ENCOUNTER — COSMETIC (OUTPATIENT)
Dept: PLASTIC SURGERY | Facility: CLINIC | Age: 41
End: 2021-08-31

## 2021-08-31 DIAGNOSIS — Z41.1 ENCOUNTER FOR COSMETIC PROCEDURE: Primary | ICD-10-CM

## 2021-08-31 PROCEDURE — RECHECK: Performed by: SURGERY

## 2021-08-31 PROCEDURE — BOTOX1U PR BOTOX BY THE UNIT: Performed by: SURGERY

## 2021-08-31 NOTE — PROGRESS NOTES
Jakub System for Botox, while she has been pleased with her prior treatments, she has asked that less be utilized at the glabella, and may be slightly more in the area  Of the lateral crows feet  At today's visit 40 units of Botox was administered treat the glabella, brow, forehead and lateral crows feet, of this approximately  7 5 units was used bilaterally for the crows feet, 2 5 units at the procerus  Ice was applied post treatment, the usual post treatment instructions were given  She will be seen again in a few months

## 2021-12-17 ENCOUNTER — COSMETIC (OUTPATIENT)
Dept: PLASTIC SURGERY | Facility: CLINIC | Age: 41
End: 2021-12-17

## 2021-12-17 DIAGNOSIS — Z41.1 ENCOUNTER FOR COSMETIC PROCEDURE: ICD-10-CM

## 2021-12-17 PROCEDURE — RECHECK: Performed by: SURGERY

## 2021-12-17 PROCEDURE — BOTOX1U PR BOTOX BY THE UNIT: Performed by: SURGERY

## 2022-03-23 ENCOUNTER — COSMETIC (OUTPATIENT)
Dept: PLASTIC SURGERY | Facility: CLINIC | Age: 42
End: 2022-03-23

## 2022-03-23 DIAGNOSIS — Z41.1 ENCOUNTER FOR COSMETIC PROCEDURE: ICD-10-CM

## 2022-03-23 PROCEDURE — BOTOX1U PR BOTOX BY THE UNIT: Performed by: SURGERY

## 2022-03-23 NOTE — PROGRESS NOTES
Pamela Manpreet returns for Botox, she has been pleased with her prior treatments  At today's visit a total of 40 units was utilized  25 units was used to treat the glabella, brow, forehead, 2 5 units of this at the procerus  Following this 7 5 units was used bilaterally (15 units ) at the level of the lateral gross feet  Ice was applied post treatment, the usual post treatment instructions were given  She will be seen again in 3 months  At that time it is likely she would be interested in Fillers to treat the nasolabial folds, we discussed Fillers at today's visit, her questions were answered to her satisfaction

## 2022-06-17 ENCOUNTER — COSMETIC (OUTPATIENT)
Dept: PLASTIC SURGERY | Facility: CLINIC | Age: 42
End: 2022-06-17

## 2022-06-17 DIAGNOSIS — Z41.1 ENCOUNTER FOR COSMETIC PROCEDURE: ICD-10-CM

## 2022-06-17 PROCEDURE — RECHECK: Performed by: SURGERY

## 2022-06-17 PROCEDURE — BOTOX1U PR BOTOX BY THE UNIT: Performed by: SURGERY

## 2022-06-17 NOTE — PROGRESS NOTES
Kt Antunez returns for Botox returns for Botox,, she has been pleased with she has been pleased with her prior her prior treatments treatments     At today's At today's visit a total visit a total of of 40 units 40 units was utilized, was utilized, 25 units to treat 25 units to treat the glabella, brow the glabella, brow, forehead and, forehead of this of this 2 5 2 5 at the procerus/nasal at the procerus/nasalis , 15 units was then divided 15 units was then divided equally at the bilateral equally at the bilateral lateral crow lateral crows feet feet  Ice  Ice was applied post treatment was applied posttreatment, the usual post, the usual posttreatment treatment instructions instructions were given were given  She will  She will be seen again be seen again in 3 months for Botox and Juvederm  In 3 months for Botox and Juvederm

## 2022-10-05 ENCOUNTER — COSMETIC (OUTPATIENT)
Dept: PLASTIC SURGERY | Facility: CLINIC | Age: 42
End: 2022-10-05

## 2022-10-05 DIAGNOSIS — Z41.1 ENCOUNTER FOR COSMETIC PROCEDURE: ICD-10-CM

## 2022-10-05 PROCEDURE — BOTOX1U PR BOTOX BY THE UNIT: Performed by: SURGERY

## 2022-10-05 PROCEDURE — RECHECK: Performed by: SURGERY

## 2022-10-05 NOTE — PROGRESS NOTES
Ebony Cruz returns for Botox, she has been pleased with her prior treatments  She is also interested in Juvederm to treat the nasolabial folds, after discussing Juvederm with her, how it works, duration, as well as potential risks, she would prefer to defer treatment due to the risk of bruising as she has plans for family photographs the next couple days  She will schedule another visit for Juvederm  At today's visit, a total of 40 units of Botox was utilized in the usual fashion, 25 units to treat the glabella, brow and forehead, 3 units of which was used at the nasalis/procerus, an additional 15 units were utilized, 7 5 units bilaterally for treatment of the lateral crows feet, ice was applied post treatment in usual post treatment instructions were given  She will be seen again in 3 months      Botox 40 units/four areas

## 2023-01-18 ENCOUNTER — COSMETIC (OUTPATIENT)
Dept: PLASTIC SURGERY | Facility: CLINIC | Age: 43
End: 2023-01-18

## 2023-01-18 DIAGNOSIS — Z41.1 ENCOUNTER FOR COSMETIC PROCEDURE: ICD-10-CM

## 2023-01-18 NOTE — PROGRESS NOTES
Madelaine Latham returns for Botox, she has been pleased with her prior treatments  At today's visit 40 units of Botox was utilized, 25 units to treat the glabella, brow and forehead, of which 3 units was used at the nasal/procerus  Following this, 15 units (7 5 units bilaterally) was used to treat the lateral crows feet  Ice was applied posttreatment in the usual posttreatment instructions were given  She will be seen again in 3 months    Botox 40 units/4 areas      Madelaine Latham presents for Juvéderm to treat the nasolabial folds and marionette lines  After topical anesthetic cream had been applied for an appropriate amount of time, the cream was removed, and the skin was prepped with alcohol  1 mL of Juvéderm XC was then injected in small aliquots from the nasolabial fold and marionette lines  She directed the areas to be injected, she checked the adequacy frequently while looking in the mirror  She is pleased with the result  Aquaphor/massage was performed, followed by application of ice  Usual posttreatment instructions were given        Juvéderm XC/1 mL

## 2023-06-26 ENCOUNTER — TELEPHONE (OUTPATIENT)
Dept: PLASTIC SURGERY | Facility: CLINIC | Age: 43
End: 2023-06-26

## 2023-06-26 NOTE — TELEPHONE ENCOUNTER
Lm for patient to please come in at 2:15 on Friday instead of 2:30  Asked to call and confirm 
Statement Selected

## 2023-06-30 ENCOUNTER — COSMETIC (OUTPATIENT)
Dept: PLASTIC SURGERY | Facility: HOSPITAL | Age: 43
End: 2023-06-30

## 2023-06-30 DIAGNOSIS — Z41.1 ENCOUNTER FOR COSMETIC PROCEDURE: ICD-10-CM

## 2023-06-30 NOTE — PROGRESS NOTES
Nidia Brown returns for Botox, she has been pleased with her prior treatments  At today's visit a total of 40 units of Botox was administered, 25 units was utilized to treat the glabella, brow and forehead, of this 3/4 units were used at the nasalis/procerus  Following this 15 units were utilized (7 5 cm per side) to treat the lateral crows feet  Ice was applied posttreatment, and usual posttreatment instructions were given she will be seen again in 3 months        Botox 40 units/4 areas

## 2023-10-11 ENCOUNTER — COSMETIC (OUTPATIENT)
Dept: PLASTIC SURGERY | Facility: CLINIC | Age: 43
End: 2023-10-11

## 2023-10-11 DIAGNOSIS — Z41.1 ENCOUNTER FOR COSMETIC PROCEDURE: Primary | ICD-10-CM

## 2023-10-11 PROCEDURE — BOTOX1U PR BOTOX BY THE UNIT: Performed by: SURGERY

## 2023-10-11 PROCEDURE — BOTOX3 THREE OR MORE AREAS OR GREATER THAN 75 UNITS: Performed by: SURGERY

## 2023-10-11 NOTE — PROGRESS NOTES
Renu Cobos returns for Botox, she has been pleased with her prior treatments. At today's visit a total of 40 units of Botox was ministered in the usual fashion. 25 units were used to treat the glabella, brow and forehead, of this 3/4 units was used at the nasalis/procerus. This was followed by an additional 15 units to the bilateral lateral crows feet (7.5 units per side). Ice was applied posttreatment, and usual posttreatment instructions were given, and she will be seen again in 3 months.     Botox 40 units/4 areas

## 2024-02-07 ENCOUNTER — COSMETIC (OUTPATIENT)
Dept: PLASTIC SURGERY | Facility: CLINIC | Age: 44
End: 2024-02-07

## 2024-02-07 DIAGNOSIS — Z41.1 ENCOUNTER FOR COSMETIC PROCEDURE: Primary | ICD-10-CM

## 2024-02-07 PROCEDURE — BOTOX3 THREE OR MORE AREAS OR GREATER THAN 75 UNITS: Performed by: SURGERY

## 2024-02-07 NOTE — PROGRESS NOTES
aJja returns for Botox, she has been pleased with her prior treatments.  At today's visit a total of 40 units of Botox was administered in usual fashion, initially 25 units was used to treat the glabella, brow and forehead, of this 3/4 units was used at the nasalis/procerus.  Following this an additional 15 units (7.5 units right, 7.5 units left) were used to treat the lateral crows feet.  Ice was applied posttreatment and usual posttreatment instructions were given.  She will be seen again in 3 months.    Botox 40 units/4 areas

## 2024-05-22 ENCOUNTER — COSMETIC (OUTPATIENT)
Dept: PLASTIC SURGERY | Facility: CLINIC | Age: 44
End: 2024-05-22

## 2024-05-22 DIAGNOSIS — Z41.1 ENCOUNTER FOR COSMETIC PROCEDURE: Primary | ICD-10-CM

## 2024-05-22 PROCEDURE — BOTOX3 THREE OR MORE AREAS OR GREATER THAN 75 UNITS: Performed by: SURGERY

## 2024-05-22 PROCEDURE — BOTOX1U PR BOTOX BY THE UNIT: Performed by: SURGERY

## 2024-05-22 NOTE — PROGRESS NOTES
Jaja returns for Botox, she has been pleased with her prior treatments.  At today's visit 40 units of Botox was administered in usual fashion, 25 units was used to treat the glabella, brow and forehead, 4 units of this was used at the nasalis/procerus.  Following this an additional 15 units (7.5 units right, 7.5 units left) were utilized to treat the lateral crows feet.  Ice was applied posttreatment, usual posttreatment instructions were given.  She will be seen again in 3 months.      Botox 40 units/4 areas

## 2024-08-19 ENCOUNTER — TELEPHONE (OUTPATIENT)
Dept: PLASTIC SURGERY | Facility: CLINIC | Age: 44
End: 2024-08-19

## 2024-08-19 NOTE — TELEPHONE ENCOUNTER
Pt had appt on 9/4 and had to reschedule due to change in providers schedule. Called and LVM that moved the appt to 9/27 at 10:00 in [facility] and to call back if this would need to be changed.

## 2024-09-27 ENCOUNTER — COSMETIC (OUTPATIENT)
Dept: PLASTIC SURGERY | Facility: HOSPITAL | Age: 44
End: 2024-09-27

## 2024-09-27 DIAGNOSIS — Z41.1 ENCOUNTER FOR COSMETIC PROCEDURE: Primary | ICD-10-CM

## 2024-09-27 PROCEDURE — BOTOX3 THREE OR MORE AREAS OR GREATER THAN 75 UNITS: Performed by: SURGERY

## 2024-09-27 PROCEDURE — BOTOX1U PR BOTOX BY THE UNIT: Performed by: SURGERY

## 2024-09-27 NOTE — PROGRESS NOTES
Jaja turns for Botox, she has been pleased with her prior treatments.  At today's visit 40 units of Botox was administered in usual fashion.  25 units was used initially to treat the glabella, brow and forehead, of this 5 units was used at the nasalis/procerus.  This was followed by 15 units, 7.5 units to the right lateral crows feet, 7.5 units to left lateral crows feet.  Ice was applied posttreatment, and usual posttreatment instructions were given.  She will be seen again in 3 months.    Botox 40 units/4 areas

## 2025-03-07 ENCOUNTER — COSMETIC (OUTPATIENT)
Dept: PLASTIC SURGERY | Facility: CLINIC | Age: 45
End: 2025-03-07

## 2025-03-07 DIAGNOSIS — Z41.1 ENCOUNTER FOR COSMETIC PROCEDURE: Primary | ICD-10-CM

## 2025-03-07 PROCEDURE — BOTOX3 THREE OR MORE AREAS OR GREATER THAN 75 UNITS: Performed by: SURGERY

## 2025-03-07 NOTE — PROGRESS NOTES
Jaja returns today for Botox, she has been pleased with her prior treatments.  Due to issues/resistance scheduling through [employer] [facility], she has not been able to arrange for an appointment until now, I have reassured her that should such issues arise in the future that she is to contact medical assistant/Shannon who will arrange for an appointment in an appropriate amount of time.  She has been pleased with her prior treatments.  At today's visit 40 units of Botox was administered in usual fashion, this was initiated with 25 units to treat the glabella, brow and forehead, of which 5 units was used at the nasalis/procerus.  This was followed by an additional 15 units, 7.5 units right, 7.5 units left to treat the lateral crows feet.  Ice was applied posttreatment and usual posttreatment instructions were given.  She will be seen again in 3 months.    Botox 40 units/4 areas

## 2025-06-20 ENCOUNTER — COSMETIC (OUTPATIENT)
Dept: PLASTIC SURGERY | Facility: CLINIC | Age: 45
End: 2025-06-20

## 2025-06-20 ENCOUNTER — TELEPHONE (OUTPATIENT)
Age: 45
End: 2025-06-20

## 2025-06-20 DIAGNOSIS — Z41.1 ENCOUNTER FOR COSMETIC PROCEDURE: Primary | ICD-10-CM

## 2025-06-20 PROCEDURE — BOTOX3 THREE OR MORE AREAS OR GREATER THAN 75 UNITS: Performed by: SURGERY

## 2025-06-20 PROCEDURE — TOXIN NEUROMODULATOR PER UNIT (BOTOX, DYSPORT, JEUVEAU, XEOMIN, DAXXIFY): Performed by: SURGERY

## 2025-06-20 NOTE — TELEPHONE ENCOUNTER
Patient called to let Dr Barrow know she will be increasing her dosage for her Botox fill today.  She is unsure how much, but wanted him to be aware ahead of time.

## 2025-06-20 NOTE — PROGRESS NOTES
Jaja returns for Botox, she has been pleased with her prior treatments, however she would like to add some additional Botox to the area of the lateral crows feet.  We talked about increasing the bilateral lateral crows feet from 7.5 units to 10 units, she is agreeable.  At today's visit 45 units of Botox was administered in usual fashion, this was initiated with 25 units to treat the glabella, brow and forehead, of which 5 units was utilized at the nasalis/procerus.  Followed by an additional 20 units, 10 units at the right lateral crows feet, 10 units at the left lateral crows feet.  Ice was applied posttreatment, and usual posttreatment instructions were given.  She will be seen again in 3 months, sooner should the need arise.      Botox 45 units/4 areas

## (undated) DEVICE — PROXIMATE SKIN STAPLERS (35 WIDE) CONTAINS 35 STAINLESS STEEL STAPLES (FIXED HEAD): Brand: PROXIMATE

## (undated) DEVICE — GLOVE INDICATOR PI UNDERGLOVE SZ 6.5 BLUE

## (undated) DEVICE — SUT STRATAFIX SPIRAL MONOCRYL PLUS 4-0 PS-2 30CM SXMP1B117

## (undated) DEVICE — INTENDED FOR TISSUE SEPARATION, AND OTHER PROCEDURES THAT REQUIRE A SHARP SURGICAL BLADE TO PUNCTURE OR CUT.: Brand: BARD-PARKER SAFETY BLADES SIZE 15, STERILE

## (undated) DEVICE — CHLORAPREP HI-LITE 26ML ORANGE

## (undated) DEVICE — 3M™ TEGADERM™ TRANSPARENT FILM DRESSING FRAME STYLE, 1624W, 2-3/8 IN X 2-3/4 IN (6 CM X 7 CM), 100/CT 4CT/CASE: Brand: 3M™ TEGADERM™

## (undated) DEVICE — SUT VICRYL 3-0 SH 27 IN J416H

## (undated) DEVICE — FUNNEL E KELLER 2 DELIVERY DEVICE

## (undated) DEVICE — 3M™ STERI-STRIP™ COMPOUND BENZOIN TINCTURE 40 BAGS/CARTON 4 CARTONS/CASE C1544: Brand: 3M™ STERI-STRIP™

## (undated) DEVICE — GAUZE SPONGES,16 PLY: Brand: CURITY

## (undated) DEVICE — NEEDLE 25G X 1 1/2

## (undated) DEVICE — 3000CC GUARDIAN II: Brand: GUARDIAN

## (undated) DEVICE — VIAL DECANTER

## (undated) DEVICE — GLOVE PI ULTRA TOUCH SZ 6

## (undated) DEVICE — ELECTRODE BLADE MOD  E-Z CLEAN 6.5IN -0014M

## (undated) DEVICE — TIBURON TRANSVERSE LAPAROTOMY SHEET: Brand: CONVERTORS

## (undated) DEVICE — GLOVE SRG BIOGEL 7

## (undated) DEVICE — SUT STRATAFIX SPIRAL MONOCRYL PLUS 4-0 PS-2 45CM SXMP1B118

## (undated) DEVICE — PAD GROUNDING ADULT

## (undated) DEVICE — BETHLEHEM UNIVERSAL MINOR GEN: Brand: CARDINAL HEALTH

## (undated) DEVICE — 3M™ DURAPORE™ SURGICAL TAPE 2 INCHES X 10YARDS (5.0CM X 9.1M) 6ROLLS/CARTON 10CARTONS/CASE 1538-2: Brand: 3M™ DURAPORE™

## (undated) DEVICE — STANDARD SURGICAL GOWN, L: Brand: CONVERTORS

## (undated) DEVICE — BRA SURGICAL SZ MED (33-36)

## (undated) DEVICE — SUT VICRYL PLUS 2-0 CTB-1 27 IN VCPB259H

## (undated) DEVICE — SPONGE LAP 18 X 18 IN

## (undated) DEVICE — SKIN MARKER DUAL TIP WITH RULER CAP, FLEXIBLE RULER AND LABELS: Brand: DEVON

## (undated) DEVICE — BAG DECANTER

## (undated) DEVICE — 3M™ STERI-STRIP™ REINFORCED ADHESIVE SKIN CLOSURES, R1547, 1/2 IN X 4 IN (12 MM X 100 MM), 6 STRIPS/ENVELOPE: Brand: 3M™ STERI-STRIP™

## (undated) DEVICE — GLOVE INDICATOR PI UNDERGLOVE SZ 7.5 BLUE

## (undated) DEVICE — X-RAY DETECTABLE SPONGES,16 PLY: Brand: VISTEC